# Patient Record
Sex: FEMALE | Race: WHITE | Employment: FULL TIME | URBAN - METROPOLITAN AREA
[De-identification: names, ages, dates, MRNs, and addresses within clinical notes are randomized per-mention and may not be internally consistent; named-entity substitution may affect disease eponyms.]

---

## 2024-04-05 ENCOUNTER — HOSPITAL ENCOUNTER (OUTPATIENT)
Dept: RADIOLOGY | Facility: HOSPITAL | Age: 59
Discharge: HOME | End: 2024-04-05
Payer: COMMERCIAL

## 2024-04-05 ENCOUNTER — LAB (OUTPATIENT)
Dept: LAB | Facility: LAB | Age: 59
End: 2024-04-05
Payer: COMMERCIAL

## 2024-04-05 VITALS — WEIGHT: 190 LBS | BODY MASS INDEX: 32.44 KG/M2 | HEIGHT: 64 IN

## 2024-04-05 DIAGNOSIS — Z13.820 ENCOUNTER FOR SCREENING FOR OSTEOPOROSIS: ICD-10-CM

## 2024-04-05 DIAGNOSIS — Z12.31 ENCOUNTER FOR SCREENING MAMMOGRAM FOR MALIGNANT NEOPLASM OF BREAST: ICD-10-CM

## 2024-04-05 DIAGNOSIS — Z01.419 ENCOUNTER FOR GYNECOLOGICAL EXAMINATION (GENERAL) (ROUTINE) WITHOUT ABNORMAL FINDINGS: Primary | ICD-10-CM

## 2024-04-05 LAB
CHOLEST SERPL-MCNC: 249 MG/DL (ref 0–199)
CHOLESTEROL/HDL RATIO: 4.5
HDLC SERPL-MCNC: 55.5 MG/DL
LDLC SERPL CALC-MCNC: 159 MG/DL
NON HDL CHOLESTEROL: 194 MG/DL (ref 0–149)
T4 FREE SERPL-MCNC: 0.71 NG/DL (ref 0.61–1.12)
TRIGL SERPL-MCNC: 175 MG/DL (ref 0–149)
TSH SERPL-ACNC: 2.23 MIU/L (ref 0.44–3.98)
VLDL: 35 MG/DL (ref 0–40)

## 2024-04-05 PROCEDURE — 82306 VITAMIN D 25 HYDROXY: CPT

## 2024-04-05 PROCEDURE — 77080 DXA BONE DENSITY AXIAL: CPT | Performed by: RADIOLOGY

## 2024-04-05 PROCEDURE — 83036 HEMOGLOBIN GLYCOSYLATED A1C: CPT

## 2024-04-05 PROCEDURE — 77067 SCR MAMMO BI INCL CAD: CPT | Performed by: RADIOLOGY

## 2024-04-05 PROCEDURE — 77067 SCR MAMMO BI INCL CAD: CPT

## 2024-04-05 PROCEDURE — 84439 ASSAY OF FREE THYROXINE: CPT

## 2024-04-05 PROCEDURE — 87624 HPV HI-RISK TYP POOLED RSLT: CPT

## 2024-04-05 PROCEDURE — 88175 CYTOPATH C/V AUTO FLUID REDO: CPT

## 2024-04-05 PROCEDURE — 36415 COLL VENOUS BLD VENIPUNCTURE: CPT

## 2024-04-05 PROCEDURE — 88141 CYTOPATH C/V INTERPRET: CPT | Performed by: PATHOLOGY

## 2024-04-05 PROCEDURE — 84443 ASSAY THYROID STIM HORMONE: CPT

## 2024-04-05 PROCEDURE — 77080 DXA BONE DENSITY AXIAL: CPT

## 2024-04-05 PROCEDURE — 80061 LIPID PANEL: CPT

## 2024-04-05 PROCEDURE — 77063 BREAST TOMOSYNTHESIS BI: CPT | Performed by: RADIOLOGY

## 2024-04-06 LAB
25(OH)D3 SERPL-MCNC: 28 NG/ML (ref 30–100)
EST. AVERAGE GLUCOSE BLD GHB EST-MCNC: 97 MG/DL
HBA1C MFR BLD: 5 %

## 2024-04-08 ENCOUNTER — LAB REQUISITION (OUTPATIENT)
Dept: LAB | Facility: HOSPITAL | Age: 59
End: 2024-04-08
Payer: COMMERCIAL

## 2024-04-08 DIAGNOSIS — R87.810 CERVICAL HIGH RISK HUMAN PAPILLOMAVIRUS (HPV) DNA TEST POSITIVE: ICD-10-CM

## 2024-04-08 DIAGNOSIS — Z01.419 ENCOUNTER FOR GYNECOLOGICAL EXAMINATION (GENERAL) (ROUTINE) WITHOUT ABNORMAL FINDINGS: ICD-10-CM

## 2024-04-24 ENCOUNTER — OFFICE VISIT (OUTPATIENT)
Dept: PAIN MEDICINE | Facility: CLINIC | Age: 59
End: 2024-04-24
Payer: COMMERCIAL

## 2024-04-24 VITALS
WEIGHT: 190 LBS | HEART RATE: 64 BPM | RESPIRATION RATE: 18 BRPM | OXYGEN SATURATION: 98 % | HEIGHT: 64 IN | BODY MASS INDEX: 32.44 KG/M2 | SYSTOLIC BLOOD PRESSURE: 122 MMHG | DIASTOLIC BLOOD PRESSURE: 82 MMHG

## 2024-04-24 DIAGNOSIS — G89.29 CHRONIC BILATERAL LOW BACK PAIN WITH RIGHT-SIDED SCIATICA: Primary | ICD-10-CM

## 2024-04-24 DIAGNOSIS — M51.27 LUMBOSACRAL DISC HERNIATION: ICD-10-CM

## 2024-04-24 DIAGNOSIS — M54.17 LUMBOSACRAL RADICULITIS: ICD-10-CM

## 2024-04-24 DIAGNOSIS — M54.41 CHRONIC BILATERAL LOW BACK PAIN WITH RIGHT-SIDED SCIATICA: Primary | ICD-10-CM

## 2024-04-24 PROCEDURE — 99204 OFFICE O/P NEW MOD 45 MIN: CPT | Performed by: ANESTHESIOLOGY

## 2024-04-24 PROCEDURE — 99214 OFFICE O/P EST MOD 30 MIN: CPT | Performed by: ANESTHESIOLOGY

## 2024-04-24 RX ORDER — IBUPROFEN 200 MG
200 TABLET ORAL EVERY 6 HOURS
COMMUNITY

## 2024-04-24 ASSESSMENT — ENCOUNTER SYMPTOMS
BACK PAIN: 1
NUMBNESS: 1
CARDIOVASCULAR NEGATIVE: 1
CONSTITUTIONAL NEGATIVE: 1
EYES NEGATIVE: 1
PSYCHIATRIC NEGATIVE: 1
RESPIRATORY NEGATIVE: 1
GASTROINTESTINAL NEGATIVE: 1
ENDOCRINE NEGATIVE: 1
HEMATOLOGIC/LYMPHATIC NEGATIVE: 1

## 2024-04-24 ASSESSMENT — PATIENT HEALTH QUESTIONNAIRE - PHQ9
SUM OF ALL RESPONSES TO PHQ9 QUESTIONS 1 AND 2: 0
2. FEELING DOWN, DEPRESSED OR HOPELESS: NOT AT ALL
1. LITTLE INTEREST OR PLEASURE IN DOING THINGS: NOT AT ALL

## 2024-04-24 ASSESSMENT — PAIN SCALES - GENERAL
PAINLEVEL_OUTOF10: 4
PAINLEVEL: 4

## 2024-04-24 ASSESSMENT — LIFESTYLE VARIABLES: TOTAL SCORE: 0

## 2024-04-24 ASSESSMENT — PAIN DESCRIPTION - DESCRIPTORS: DESCRIPTORS: OTHER (COMMENT);ACHING

## 2024-04-24 ASSESSMENT — PAIN - FUNCTIONAL ASSESSMENT: PAIN_FUNCTIONAL_ASSESSMENT: 0-10

## 2024-04-24 NOTE — PROGRESS NOTES
PAIN MANAGEMENT NEW PATIENT OFFICE NOTE    Date of Service: 4/24/2024    SUBJECTIVE    CHIEF COMPLAINT: LBP    HISTORY OF PRESENT ILLNESS    Torrie Godoy is a 58 y.o. female with PMH lumbar disc herniation, skin ca s/p excision who presents as new patient referred by Dr Taylor with LB pain.    Pt describes work incident in January 2023. Pt recalls lifting 50 lb salt bags at work. She had lifted 5 bags that day, but while lifting the 6th bag and turning, she suddenly felt severe LBP and dropped the bag involuntary. Due to pain, pt was taken by ambulance to [facility] ER. There, CT was performed and L5-S1 disc herniation was identified. Pt was treated conservatively for her sx and discharged home. Since that time, pt describes severe LBP that radiates BL. Pain also radiates to R buttock  and R heel. Pain assoc with RLE tingling. Pain worse with prolonged sitting and improved with moving/laying down.  Pt has tried >6 w PT, aqua therapy, chiropractic care, Tylenol, NSAIDs, MDP, oxycodone. Pt saw spine surgeon Dr Reynolds ~2/2023 who rec'd surgery, but this was reportedly denied by [facility]. Claims she did EMG, but does not recall where. Pt is working light duty due to ongoing pain.    Pt denies new-onset weakness, bowel/bladder incontinence.  Pt denies recent infection, allergy to Latex/iodine/contrast. Patient is currently taking the following blood thinner(s): N/A    REVIEW OF SYSTEMS  Review of Systems   Constitutional: Negative.    HENT: Negative.     Eyes: Negative.    Respiratory: Negative.     Cardiovascular: Negative.    Gastrointestinal: Negative.    Endocrine: Negative.    Musculoskeletal:  Positive for back pain.   Skin: Negative.    Neurological:  Positive for numbness.   Hematological: Negative.    Psychiatric/Behavioral: Negative.         PAST MEDICAL HISTORY  History reviewed. No pertinent past medical history.  History reviewed. No pertinent surgical history.  No family history on file.    CURRENT  "MEDICATIONS  Current Outpatient Medications   Medication Sig Dispense Refill    ibuprofen 200 mg tablet Take 1 tablet (200 mg) by mouth every 6 hours.       No current facility-administered medications for this visit.       ALLERGIES AND DRUG REACTIONS  Allergies   Allergen Reactions    Clarithromycin Hives    Doxycycline GI Upset and Unknown    Penicillins GI Upset and Unknown    Rofecoxib Unknown          OBJECTIVE  Visit Vitals  /82   Pulse 64   Resp 18   Ht 1.626 m (5' 4\")   Wt 86.2 kg (190 lb)   LMP  (LMP Unknown)   SpO2 98%   BMI 32.61 kg/m²   OB Status Postmenopausal   Smoking Status Never   BSA 1.97 m²       Last Recorded Pain Score (if available):                Physical Exam  General: Sitting in chair, NAD  Head: NCAT  Eyes: Sclera/conjunctiva clear, EOMI, PERRL  Nose/mouth: MMM  CV: Good distal pulses  Lungs: Good/equal chest excursion  Abdomen: Soft, ND  Ext: No cyanosis/edema  MSK: L-spine alignment: WNL, BL paraspinal m TTP, L-spine ROM: ext/twisting lmtd by pain    Neuro: AAOx3   Dermatome sensation to light touch  LEFT L1 (lower pelvis/upper thigh): WNL    RIGHT L1: WNL      LEFT L2 (upper thigh): WNL       RIGHT: L2:WNL      LEFT L3 (medial knee): WNL       RIGHT L3: WNL      LEFT L4 (superior medial malleolus): WNL       RIGHT L4: WNL      LEFT L5 (dorsal foot): WNL       RIGHT L5: WNL      LEFT S1 (lateral foot): WNL     RIGHT S1: WNL      LEFT S2 (popliteal fossa): WNL    RIGHT S2: WNL        Motor strength  LEFT L2 (hip flexion): 5/5   RIGHT L2: 5/5  LEFT L3 (knee extension): 5/5     RIGHT L3: 5/5  LEFT L4 (dorsiflexion): 5/5     RIGHT L4: 5/5  LEFT L5 (EHL extension): 5/5     RIGHT L5: 5/5  LEFT S1 (plantarflexion): 5/5     RIGHT S1: 5/5  LEFT S2 (knee flexion): 5/5      RIGHT S2: 5/5    Special testing  DTR unremarkable  Seated slump test neg BL  Clonus: neg BL  Babinski: neg BL    Psych: affect nl  Skin: no rash/lesions      REVIEW OF LABORATORY DATA  I have reviewed the following lab " "results:  WBC   Date Value Ref Range Status   10/14/2021 6.0 4.4 - 11.3 x10E9/L Final     RBC   Date Value Ref Range Status   10/14/2021 4.72 4.00 - 5.20 x10E12/L Final     Hemoglobin   Date Value Ref Range Status   10/14/2021 14.0 12.0 - 16.0 g/dL Final     Hematocrit   Date Value Ref Range Status   10/14/2021 42.3 36.0 - 46.0 % Final     MCV   Date Value Ref Range Status   10/14/2021 90 80 - 100 fL Final     MCHC   Date Value Ref Range Status   10/14/2021 33.1 32.0 - 36.0 g/dL Final     RDW   Date Value Ref Range Status   10/14/2021 12.5 11.5 - 14.5 % Final     Platelets   Date Value Ref Range Status   10/14/2021 310 150 - 450 x10E9/L Final     Sodium   Date Value Ref Range Status   10/14/2021 140 136 - 145 mmol/L Final     Potassium   Date Value Ref Range Status   10/14/2021 4.4 3.5 - 5.3 mmol/L Final     Bicarbonate   Date Value Ref Range Status   10/14/2021 31 21 - 32 mmol/L Final     Urea Nitrogen   Date Value Ref Range Status   10/14/2021 25 (H) 6 - 23 mg/dL Final     Calcium   Date Value Ref Range Status   10/14/2021 9.2 8.6 - 10.3 mg/dL Final     No results found for: \"PROTIME\", \"PTT\", \"INR\", \"FIBRINOGEN\"      REVIEW OF RADIOLOGY   I have reviewed the following:  Radiology Studies           CT L-spine 1/30/23:  The alignment is anatomic.  There is no fracture or traumatic  subluxation.     The vertebral body heights are well maintained.     At L1-2: There is a broad-based disc bulge. There are degenerative  change the facet joints resulting in mild narrowing of the neural  canal. There is narrowing of the foramina bilaterally but the exiting  nerve roots are not effaced.     At L2-3: There is intervertebral disc space narrowing with broad-based  disc bulge. This is most pronounced on the left. This does narrow the  neural foramina but the exiting nerve roots are not effaced. There are  degenerative changes of the facet joints but there is no significant  spinal stenosis.     At L3-4: There is a broad-based " disc bulge. This does narrow the  neural foramina bilaterally but the exiting nerve roots are not  effaced. There are degenerative change the facet joints but there is  no significant spinal stenosis.     At L4-5: There is minimal anterolisthesis of L4 on L5. This results in  uncovering of the disc and a broad-based disc bulge. This does extend  into the neural foramina particularly on the right and abutting  against the exiting right L4 nerve root but there is no significant  displacement. There are degenerative changes of the facet joints  resulting in mild narrowing of the neural canal but there still  epidural fat remaining.     L5-S1: There is a focal disc herniation centrally and to the right.  This does efface the origin of the right S1 nerve root. There is no  significant spinal stenosis.     The paravertebral soft tissues are within normal limits.  The  visualized abdomen is unremarkable.     IMPRESSION:  Focal disc herniation at L5-S1 effacing the origin of the right S1  nerve root.     Diffuse degenerative changes of the lumbosacral spine.       ASSESSMENT & PLAN  Torrie Godoy is a 58 y.o. old female with PMH umbar disc herniation, skin ca s/p excision who presents as new patient referred by chiro Dr Taylor with LB pain.    1) LBP  -Likely 2/2 lifting incident at work in January 2023 with radiation to RLE in radicular fashion with subjective tingling without objective deficit  -Refractive to >6 w PT, aqua therapy, chiropractic care, Tylenol, NSAIDs, MDP, oxycodone  -Reviewed/discussed CT L-spine showing L5-S1 disc herniation effacing R S1 n root  -Saw spine surgeon Dr Reynolds ~2/2023 who rec'd surgery, but this was reportedly denied by [facility]  -Pt to bring EMG from outside facility for review  -Due to persistent pain impacting and inhibiting pt's QoL and RTW, rec R L4-5, l5-S1 TFESI to target pain generator as seen on imaging and minimize risk/likelihood of chronic opioid use and/or  surgery          Discussed procedure risks/benefits in detail with patient. Pt meets medical necessity for procedure due to failure of conservative measures. Reviewed procedural risks including bleeding, infection, nerve damage, paralysis. Also reviewed mitigating factors such as screening for infection/blood thinner use, sterile precautions, and image-guidance when applicable. All questions answered. Pt/guardian expressed understanding and choose to proceed           Indy Shepherd MD  Anesthesiologist & Interventional Pain Physician   [facility] [place]  O: [phone]  F: [phone]  12:01 PM  04/24/24

## 2024-04-25 PROBLEM — M51.27 LUMBOSACRAL DISC HERNIATION: Status: ACTIVE | Noted: 2024-04-25

## 2024-04-25 PROBLEM — M54.41 CHRONIC BILATERAL LOW BACK PAIN WITH RIGHT-SIDED SCIATICA: Status: ACTIVE | Noted: 2024-04-25

## 2024-04-25 PROBLEM — M54.17 LUMBOSACRAL RADICULITIS: Status: ACTIVE | Noted: 2024-04-25

## 2024-04-25 PROBLEM — G89.29 CHRONIC BILATERAL LOW BACK PAIN WITH RIGHT-SIDED SCIATICA: Status: ACTIVE | Noted: 2024-04-25

## 2024-05-02 ENCOUNTER — TELEPHONE (OUTPATIENT)
Dept: PAIN MEDICINE | Facility: CLINIC | Age: 59
End: 2024-05-02
Payer: COMMERCIAL

## 2024-05-30 ENCOUNTER — APPOINTMENT (OUTPATIENT)
Dept: GASTROENTEROLOGY | Facility: HOSPITAL | Age: 59
End: 2024-05-30
Payer: COMMERCIAL

## 2024-06-12 ENCOUNTER — APPOINTMENT (OUTPATIENT)
Dept: PAIN MEDICINE | Facility: CLINIC | Age: 59
End: 2024-06-12
Payer: COMMERCIAL

## 2024-06-18 ENCOUNTER — PREP FOR PROCEDURE (OUTPATIENT)
Dept: RADIOLOGY | Facility: EXTERNAL LOCATION | Age: 59
End: 2024-06-18
Payer: COMMERCIAL

## 2024-06-18 DIAGNOSIS — M54.17 LUMBOSACRAL RADICULOPATHY: Primary | ICD-10-CM

## 2024-07-23 ENCOUNTER — ANCILLARY PROCEDURE (OUTPATIENT)
Dept: RADIOLOGY | Facility: EXTERNAL LOCATION | Age: 59
End: 2024-07-23
Payer: COMMERCIAL

## 2024-07-23 DIAGNOSIS — M54.17 RADICULOPATHY, LUMBOSACRAL REGION: ICD-10-CM

## 2024-07-23 PROCEDURE — 64483 NJX AA&/STRD TFRM EPI L/S 1: CPT | Performed by: ANESTHESIOLOGY

## 2024-07-23 NOTE — PROGRESS NOTES
OPERATIVE NOTE  Preprocedure diagnosis: Lumbosacral radic  Postprocedure diagnosis Lumbosacral radic    Procedure performed: Right L5-S1 TFESI  Physician: Indy Shepherd MD  Anesthesia: Local  Complications: none  Blood loss:  Nil    Clinical note: This is a very pleasant 59 y.o. old female with PMH lumbar disc herniation, skin ca s/p excision who suffers with LBP here meeting all medical criteria for above-mentioned procedure. Patient's pain stable and persistent from last visit.  No personal/family hx issues with anesthesia. Denies allergies to Latex, steroids, local anesthetics, or iodine/contrast. Denies being on blood thinners. Not diabetic.  Denies fever, chills, NS, CP, SOB, cough, N/V.    Discussed procedure risks/benefits in detail with patient. Pt meets medical necessity for procedure due to failure of conservative measures. Reviewed procedural risks including bleeding, infection, nerve damage, paralysis. Also reviewed mitigating factors such as screening for infection/blood thinner use, sterile precautions, and image-guidance when applicable. All questions answered. Pt/guardian expressed understanding and choose to proceed      Procedure:    Procedure: RIGHT L5-S1 transforaminal epidural steroid injection  Location: [facility]     Informed consent completed.  Patient brought to procedure suite and positioned on table prone.  Lower back prepped with chlorhexidine and draped in sterile fashion.  Fluoroscopy utilized to identify right L5-S1 levels.  C-arm was obliqued to RIGHT side to visualize L5-S1 foramina.  1.5 inch 25-gauge needle used to administer1% lidocaine for local anesthesia.  Then, 3.5 inch 22-gauge needle were advanced coaxially to superolateral aspect of foramina via x-ray guidance.  AP view utilized to visualize needle approaching junction of the transverse process and vertebral body.  Lateral view taken to visualize needles just posterior to spinal canal.  Needle was then advanced  slightly and after negative add aspiration, 1.5 cc Omnipaque 240 administered at each level revealing adequate epidural spread without vascular uptake.  AP view taken to confirm epidural spread medially without vascular uptake.  Then, 15 mg dexamethasone injected without issue    Total injectate: 15 mg dexamethasone     Needles re-styleted and removed.  Hemostasis achieved.  Band-Aids placed.  Patient tolerated procedure well and without issue.  They were discharged home in stable condition.      Indy Shepherd MD  Anesthesiologist & Interventional Pain Physician   [facility] [place]  O: [phone]  F: [phone]  9:43 AM  07/23/24

## 2024-10-21 PROCEDURE — 87624 HPV HI-RISK TYP POOLED RSLT: CPT

## 2024-10-21 PROCEDURE — 88175 CYTOPATH C/V AUTO FLUID REDO: CPT

## 2024-10-22 ENCOUNTER — LAB REQUISITION (OUTPATIENT)
Dept: LAB | Facility: HOSPITAL | Age: 59
End: 2024-10-22
Payer: COMMERCIAL

## 2024-10-22 DIAGNOSIS — Z01.419 ENCOUNTER FOR GYNECOLOGICAL EXAMINATION (GENERAL) (ROUTINE) WITHOUT ABNORMAL FINDINGS: ICD-10-CM

## 2024-10-22 DIAGNOSIS — R87.612 LOW GRADE SQUAMOUS INTRAEPITHELIAL LESION ON CYTOLOGIC SMEAR OF CERVIX (LGSIL): ICD-10-CM

## 2024-10-22 DIAGNOSIS — Z11.51 ENCOUNTER FOR SCREENING FOR HUMAN PAPILLOMAVIRUS (HPV): ICD-10-CM

## 2024-11-01 LAB
CYTOLOGY CMNT CVX/VAG CYTO-IMP: NORMAL
HPV HR 12 DNA GENITAL QL NAA+PROBE: POSITIVE
HPV HR GENOTYPES PNL CVX NAA+PROBE: POSITIVE
HPV16 DNA SPEC QL NAA+PROBE: NEGATIVE
HPV18 DNA SPEC QL NAA+PROBE: NEGATIVE
LAB AP HPV GENOTYPE QUESTION: YES
LAB AP HPV HR: NORMAL
LAB AP PREVIOUS ABNORMAL HISTORY: NORMAL
LABORATORY COMMENT REPORT: NORMAL
PATH REPORT.TOTAL CANCER: NORMAL

## 2025-03-03 PROCEDURE — 88305 TISSUE EXAM BY PATHOLOGIST: CPT

## 2025-03-04 ENCOUNTER — LAB REQUISITION (OUTPATIENT)
Dept: LAB | Facility: HOSPITAL | Age: 60
End: 2025-03-04
Payer: COMMERCIAL

## 2025-03-04 DIAGNOSIS — R87.612 LOW GRADE SQUAMOUS INTRAEPITHELIAL LESION ON CYTOLOGIC SMEAR OF CERVIX (LGSIL): ICD-10-CM

## 2025-03-07 LAB
LABORATORY COMMENT REPORT: NORMAL
PATH REPORT.FINAL DX SPEC: NORMAL
PATH REPORT.GROSS SPEC: NORMAL
PATH REPORT.RELEVANT HX SPEC: NORMAL
PATH REPORT.TOTAL CANCER: NORMAL

## 2025-04-23 ENCOUNTER — HOSPITAL ENCOUNTER (OUTPATIENT)
Dept: RADIOLOGY | Facility: HOSPITAL | Age: 60
Discharge: HOME | End: 2025-04-23
Payer: COMMERCIAL

## 2025-04-23 VITALS — WEIGHT: 170 LBS | HEIGHT: 64 IN | BODY MASS INDEX: 29.02 KG/M2

## 2025-04-23 DIAGNOSIS — Z12.31 ENCOUNTER FOR SCREENING MAMMOGRAM FOR MALIGNANT NEOPLASM OF BREAST: ICD-10-CM

## 2025-04-23 PROCEDURE — 77067 SCR MAMMO BI INCL CAD: CPT | Performed by: RADIOLOGY

## 2025-04-23 PROCEDURE — 77067 SCR MAMMO BI INCL CAD: CPT

## 2025-04-23 PROCEDURE — 77063 BREAST TOMOSYNTHESIS BI: CPT | Performed by: RADIOLOGY
